# Patient Record
Sex: FEMALE | Race: BLACK OR AFRICAN AMERICAN | NOT HISPANIC OR LATINO | URBAN - METROPOLITAN AREA
[De-identification: names, ages, dates, MRNs, and addresses within clinical notes are randomized per-mention and may not be internally consistent; named-entity substitution may affect disease eponyms.]

---

## 2018-09-11 ENCOUNTER — EMERGENCY (EMERGENCY)
Facility: HOSPITAL | Age: 53
LOS: 0 days | Discharge: HOME | End: 2018-09-11
Attending: EMERGENCY MEDICINE
Payer: MEDICAID

## 2018-09-11 VITALS
DIASTOLIC BLOOD PRESSURE: 77 MMHG | TEMPERATURE: 98 F | RESPIRATION RATE: 16 BRPM | HEART RATE: 100 BPM | OXYGEN SATURATION: 98 % | SYSTOLIC BLOOD PRESSURE: 122 MMHG

## 2018-09-11 VITALS
TEMPERATURE: 98 F | HEIGHT: 65 IN | WEIGHT: 160.06 LBS | HEART RATE: 89 BPM | OXYGEN SATURATION: 96 % | RESPIRATION RATE: 16 BRPM | SYSTOLIC BLOOD PRESSURE: 122 MMHG | DIASTOLIC BLOOD PRESSURE: 71 MMHG

## 2018-09-11 DIAGNOSIS — J45.901 UNSPECIFIED ASTHMA WITH (ACUTE) EXACERBATION: ICD-10-CM

## 2018-09-11 PROCEDURE — 71046 X-RAY EXAM CHEST 2 VIEWS: CPT | Mod: 26

## 2018-09-11 PROCEDURE — 99284 EMERGENCY DEPT VISIT MOD MDM: CPT

## 2018-09-11 RX ORDER — AZITHROMYCIN 500 MG/1
500 TABLET, FILM COATED ORAL ONCE
Qty: 0 | Refills: 0 | Status: COMPLETED | OUTPATIENT
Start: 2018-09-11 | End: 2018-09-11

## 2018-09-11 RX ORDER — ALBUTEROL 90 UG/1
2 AEROSOL, METERED ORAL
Qty: 1 | Refills: 0
Start: 2018-09-11 | End: 2018-10-10

## 2018-09-11 RX ORDER — AZITHROMYCIN 500 MG/1
1 TABLET, FILM COATED ORAL
Qty: 4 | Refills: 0 | OUTPATIENT
Start: 2018-09-11 | End: 2018-09-14

## 2018-09-11 RX ORDER — ALBUTEROL 90 UG/1
3 AEROSOL, METERED ORAL
Qty: 30 | Refills: 0
Start: 2018-09-11

## 2018-09-11 RX ORDER — IPRATROPIUM/ALBUTEROL SULFATE 18-103MCG
3 AEROSOL WITH ADAPTER (GRAM) INHALATION
Qty: 0 | Refills: 0 | Status: COMPLETED | OUTPATIENT
Start: 2018-09-11 | End: 2018-09-11

## 2018-09-11 RX ADMIN — Medication 3 MILLILITER(S): at 11:55

## 2018-09-11 RX ADMIN — Medication 50 MILLIGRAM(S): at 12:23

## 2018-09-11 RX ADMIN — Medication 3 MILLILITER(S): at 12:24

## 2018-09-11 RX ADMIN — AZITHROMYCIN 500 MILLIGRAM(S): 500 TABLET, FILM COATED ORAL at 14:26

## 2018-09-11 RX ADMIN — Medication 3 MILLILITER(S): at 12:17

## 2018-09-11 NOTE — ED PROVIDER NOTE - PHYSICAL EXAMINATION
General:     NAD, well-nourished, well-appearing  Head:     NC/AT, EOMI, oral mucosa moist  Neck:     trachea midline  Lungs:   insp/exp wheezing  CVS:     S1S2, RRR, no m/g/r  Abd:     +BS, s/nt/nd, no organomegaly  Ext:    2+ radial and pedal pulses, no c/c/e  Neuro: AAOx3, no sensory/motor deficits

## 2018-09-11 NOTE — ED PROVIDER NOTE - NS ED ROS FT
Constitutional: (-) fever  (-)chills  (-)sweats  Eyes/ENT: (-) blurry vision, (-) epistaxis  (-)rhinorrhea   (-) sore throat    Cardiovascular: (-) chest pain, (-) palpitations (-) edema   Respiratory: (+) cough, (+) shortness of breath   Neurological: (-) headache, (-) altered mental status  (-)LOC

## 2018-09-11 NOTE — ED ADULT NURSE NOTE - OBJECTIVE STATEMENT
pt sitting on stretcher alert and oriented x4 c/o asthma excerbation since yesterday denies any fever

## 2018-09-11 NOTE — ED PROVIDER NOTE - OBJECTIVE STATEMENT
53yoF; with pmh signif for asthma; now p/w cough, wheezing, sob x2 days. states she does not having any albuterol inhaler so no treatment at home. denies f/c/s. denies sputum production. denies cp/palp. denies abd pain. denies n/v. denies sick contacts. denies travel.  PMH: Asthma  SOCIAL: No tobacco/illicit substance use/EtOH

## 2019-04-14 ENCOUNTER — EMERGENCY (EMERGENCY)
Facility: HOSPITAL | Age: 54
LOS: 0 days | Discharge: HOME | End: 2019-04-14
Attending: EMERGENCY MEDICINE
Payer: MEDICAID

## 2019-04-14 VITALS
WEIGHT: 164.91 LBS | OXYGEN SATURATION: 98 % | SYSTOLIC BLOOD PRESSURE: 132 MMHG | HEART RATE: 87 BPM | DIASTOLIC BLOOD PRESSURE: 83 MMHG | RESPIRATION RATE: 18 BRPM | TEMPERATURE: 98 F | HEIGHT: 66 IN

## 2019-04-14 DIAGNOSIS — R06.2 WHEEZING: ICD-10-CM

## 2019-04-14 DIAGNOSIS — J30.2 OTHER SEASONAL ALLERGIC RHINITIS: ICD-10-CM

## 2019-04-14 DIAGNOSIS — J45.901 UNSPECIFIED ASTHMA WITH (ACUTE) EXACERBATION: ICD-10-CM

## 2019-04-14 DIAGNOSIS — F17.200 NICOTINE DEPENDENCE, UNSPECIFIED, UNCOMPLICATED: ICD-10-CM

## 2019-04-14 PROCEDURE — 99284 EMERGENCY DEPT VISIT MOD MDM: CPT

## 2019-04-14 PROCEDURE — 71045 X-RAY EXAM CHEST 1 VIEW: CPT | Mod: 26

## 2019-04-14 RX ORDER — LORATADINE 10 MG/1
1 TABLET ORAL
Qty: 10 | Refills: 0 | OUTPATIENT
Start: 2019-04-14 | End: 2019-04-23

## 2019-04-14 RX ORDER — IPRATROPIUM/ALBUTEROL SULFATE 18-103MCG
3 AEROSOL WITH ADAPTER (GRAM) INHALATION ONCE
Qty: 0 | Refills: 0 | Status: COMPLETED | OUTPATIENT
Start: 2019-04-14 | End: 2019-04-14

## 2019-04-14 RX ORDER — ALBUTEROL 90 UG/1
2 AEROSOL, METERED ORAL
Qty: 1 | Refills: 0
Start: 2019-04-14 | End: 2019-05-13

## 2019-04-14 RX ORDER — ALBUTEROL 90 UG/1
2.5 AEROSOL, METERED ORAL ONCE
Qty: 0 | Refills: 0 | Status: COMPLETED | OUTPATIENT
Start: 2019-04-14 | End: 2019-04-14

## 2019-04-14 RX ORDER — ALBUTEROL 90 UG/1
3 AEROSOL, METERED ORAL
Qty: 1 | Refills: 0
Start: 2019-04-14 | End: 2019-05-13

## 2019-04-14 RX ADMIN — Medication 3 MILLILITER(S): at 22:30

## 2019-04-14 RX ADMIN — ALBUTEROL 2.5 MILLIGRAM(S): 90 AEROSOL, METERED ORAL at 21:35

## 2019-04-14 RX ADMIN — Medication 3 MILLILITER(S): at 22:48

## 2019-04-14 RX ADMIN — Medication 50 MILLIGRAM(S): at 22:35

## 2019-04-14 NOTE — ED PROVIDER NOTE - CLINICAL SUMMARY MEDICAL DECISION MAKING FREE TEXT BOX
Patient presents for asthma exacerbation, now improved.  CXR negative.  Feels much better.  Patient given prescription medications for their condition and advised to take them as prescribed and check with their Primary Care Provider if any questions arise. Discussed results and outcome of today's visit with the patient.  Patient advised to please follow up with another healthcare provider within the next 24 hours and return to the Emergency Department for worsening symptoms or any other concerns.  Patient advised that their doctor may call  to follow up on the specific results of the tests performed today in the emergency department.   Patient appears well on discharge.

## 2019-04-14 NOTE — ED PROVIDER NOTE - OBJECTIVE STATEMENT
Pertinent PMH/PSH/FHx/SHx and Review of Systems contained within:  Patient presents to the ED for asthma exacerbation.  Patient well appearing, reports increased shortness of breath and wheezing for the last few days, feels she may have seasonal allergies, reports itching around ears and tearing eyes.  Denies fever or cough.  Denies any chest pain, leg swelling, calf pain, hemoptysis, or use of exogenous estrogen.  Feels this is her typical exacerbation, not relieved with inhaler at home.  No prior history of intubation.     Relevant PMHx/SHx/SOCHx/FAMH:  Asthma  +Smoker, denies use of other illict drugs or h/o alchol abuse    ROS: No fever/chills, No headache/photophobia/eye pain/changes in vision, No ear pain/sore throat/dysphagia, No chest pain/palpitations, no cough/stridor, No abdominal pain, No N/V/D/melena, no dysuria/frequency/discharge, No neck/back pain, no rash, no changes in neurological status/function.

## 2019-04-14 NOTE — ED ADULT TRIAGE NOTE - CHIEF COMPLAINT QUOTE
c/o asthma exacerbation started today with wheezing and shortness of breath ran out of asthma inhaler at home no hx prior intubation per pt

## 2019-04-14 NOTE — ED PROVIDER NOTE - CARE PLAN
Principal Discharge DX:	Asthma, unspecified asthma severity, unspecified whether complicated, unspecified whether persistent  Secondary Diagnosis:	Seasonal allergies

## 2019-04-14 NOTE — ED PROVIDER NOTE - PHYSICAL EXAMINATION
Gen: Alert, NAD, well appearing  Head: NC, AT, PERRL, EOMI, normal lids/conjunctiva  ENT: normal hearing, patent oropharynx without erythema/exudate, uvula midline, TMs BL normal  Neck: +supple, no tenderness/meningismus/JVD, +Trachea midline  Pulm: Bilateral BS, normal resp effort, BL wheezes, no stridor/retractions  CV: RRR, no M/R/G, +dist pulses  Abd: soft, NT/ND, +BS, no palpable masses  Mskel: no edema/erythema/cyanosis  Skin: no rash, warm/dry  Neuro: AAOx3, no apparent sensory/motor deficits, coordination intact

## 2019-04-15 PROBLEM — J45.909 UNSPECIFIED ASTHMA, UNCOMPLICATED: Chronic | Status: ACTIVE | Noted: 2018-09-11

## 2020-08-03 NOTE — ED ADULT TRIAGE NOTE - BP NONINVASIVE DIASTOLIC (MM HG)
COVID-19 SCREENING    Do you or any of your household members have the following symptoms:  Fever >100.0*F or >38.0*C: No    New or worsening cough, shortness of breath, or sore throat: No    New onset of nausea, vomiting or diarrhea: No    New onset of loss of taste or smell, chills, repeated shaking with chills, muscle pain, or headache: No    Have you, a household member, or another person you have been in contact with tested positive for COVID-19 in the last 14 days?: Yes    Emergent COVID-19 Symptoms requiring Nurse Triage:  Trouble breathing, Persistent pain or pressure in the chest, New confusion, Inability to wake or stay awake, Bluish lips or face    If any of the above questions are positive and the patient is not having emergent symptoms - order COVID-19 lab test, register, & schedule patient for community testing.     DO NOT schedule any other appointments unless directed by a facility or provider (ex:  being discharged from hospital needing 1st pediatric appointment, TIA clinic patients, etc). Appointments can be canceled, but DO NOT schedule/reschedule patient requested appointments.        83

## 2022-02-15 NOTE — ED ADULT NURSE NOTE - NSFALLRSKPASTHIST_ED_ALL_ED
no Mirvaso Counseling: Mirvaso is a topical medication which can decrease superficial blood flow where applied. Side effects are uncommon and include stinging, redness and allergic reactions.

## 2023-05-31 ENCOUNTER — INPATIENT (INPATIENT)
Facility: HOSPITAL | Age: 58
LOS: 3 days | Discharge: HOME | End: 2023-06-04
Attending: HOSPITALIST | Admitting: HOSPITALIST
Payer: MEDICAID

## 2023-05-31 VITALS
WEIGHT: 169.98 LBS | SYSTOLIC BLOOD PRESSURE: 151 MMHG | DIASTOLIC BLOOD PRESSURE: 110 MMHG | HEIGHT: 63 IN | OXYGEN SATURATION: 100 % | TEMPERATURE: 98 F | HEART RATE: 131 BPM | RESPIRATION RATE: 19 BRPM

## 2023-05-31 LAB
ALBUMIN SERPL ELPH-MCNC: 4.2 G/DL — SIGNIFICANT CHANGE UP (ref 3.3–5)
ALP SERPL-CCNC: 109 U/L — SIGNIFICANT CHANGE UP (ref 40–120)
ALT FLD-CCNC: 42 U/L — SIGNIFICANT CHANGE UP (ref 12–78)
ANION GAP SERPL CALC-SCNC: 8 MMOL/L — SIGNIFICANT CHANGE UP (ref 5–17)
AST SERPL-CCNC: 39 U/L — HIGH (ref 15–37)
BASOPHILS # BLD AUTO: 0.01 K/UL — SIGNIFICANT CHANGE UP (ref 0–0.2)
BASOPHILS NFR BLD AUTO: 0.2 % — SIGNIFICANT CHANGE UP (ref 0–2)
BILIRUB SERPL-MCNC: 0.5 MG/DL — SIGNIFICANT CHANGE UP (ref 0.2–1.2)
BUN SERPL-MCNC: 22 MG/DL — SIGNIFICANT CHANGE UP (ref 7–23)
CALCIUM SERPL-MCNC: 9.9 MG/DL — SIGNIFICANT CHANGE UP (ref 8.5–10.1)
CHLORIDE SERPL-SCNC: 108 MMOL/L — SIGNIFICANT CHANGE UP (ref 96–108)
CO2 SERPL-SCNC: 25 MMOL/L — SIGNIFICANT CHANGE UP (ref 22–31)
CREAT SERPL-MCNC: 0.7 MG/DL — SIGNIFICANT CHANGE UP (ref 0.5–1.3)
EGFR: 101 ML/MIN/1.73M2 — SIGNIFICANT CHANGE UP
EOSINOPHIL # BLD AUTO: 0.01 K/UL — SIGNIFICANT CHANGE UP (ref 0–0.5)
EOSINOPHIL NFR BLD AUTO: 0.2 % — SIGNIFICANT CHANGE UP (ref 0–6)
ETHANOL SERPL-MCNC: 57 MG/DL — HIGH (ref 0–10)
GLUCOSE SERPL-MCNC: 97 MG/DL — SIGNIFICANT CHANGE UP (ref 70–99)
HCG SERPL-ACNC: 3 MIU/ML — SIGNIFICANT CHANGE UP
HCT VFR BLD CALC: 44.2 % — SIGNIFICANT CHANGE UP (ref 34.5–45)
HGB BLD-MCNC: 14.9 G/DL — SIGNIFICANT CHANGE UP (ref 11.5–15.5)
IMM GRANULOCYTES NFR BLD AUTO: 0.4 % — SIGNIFICANT CHANGE UP (ref 0–0.9)
LYMPHOCYTES # BLD AUTO: 1.52 K/UL — SIGNIFICANT CHANGE UP (ref 1–3.3)
LYMPHOCYTES # BLD AUTO: 33 % — SIGNIFICANT CHANGE UP (ref 13–44)
MAGNESIUM SERPL-MCNC: 2 MG/DL — SIGNIFICANT CHANGE UP (ref 1.6–2.6)
MCHC RBC-ENTMCNC: 32.3 PG — SIGNIFICANT CHANGE UP (ref 27–34)
MCHC RBC-ENTMCNC: 33.7 G/DL — SIGNIFICANT CHANGE UP (ref 32–36)
MCV RBC AUTO: 95.9 FL — SIGNIFICANT CHANGE UP (ref 80–100)
MONOCYTES # BLD AUTO: 0.31 K/UL — SIGNIFICANT CHANGE UP (ref 0–0.9)
MONOCYTES NFR BLD AUTO: 6.7 % — SIGNIFICANT CHANGE UP (ref 2–14)
NEUTROPHILS # BLD AUTO: 2.73 K/UL — SIGNIFICANT CHANGE UP (ref 1.8–7.4)
NEUTROPHILS NFR BLD AUTO: 59.5 % — SIGNIFICANT CHANGE UP (ref 43–77)
NRBC # BLD: 0 /100 WBCS — SIGNIFICANT CHANGE UP (ref 0–0)
PLATELET # BLD AUTO: 186 K/UL — SIGNIFICANT CHANGE UP (ref 150–400)
POTASSIUM SERPL-MCNC: 5.2 MMOL/L — SIGNIFICANT CHANGE UP (ref 3.5–5.3)
POTASSIUM SERPL-SCNC: 5.2 MMOL/L — SIGNIFICANT CHANGE UP (ref 3.5–5.3)
PROT SERPL-MCNC: 8.6 GM/DL — HIGH (ref 6–8.3)
RBC # BLD: 4.61 M/UL — SIGNIFICANT CHANGE UP (ref 3.8–5.2)
RBC # FLD: 14.5 % — SIGNIFICANT CHANGE UP (ref 10.3–14.5)
SODIUM SERPL-SCNC: 141 MMOL/L — SIGNIFICANT CHANGE UP (ref 135–145)
WBC # BLD: 4.6 K/UL — SIGNIFICANT CHANGE UP (ref 3.8–10.5)
WBC # FLD AUTO: 4.6 K/UL — SIGNIFICANT CHANGE UP (ref 3.8–10.5)

## 2023-05-31 PROCEDURE — 99222 1ST HOSP IP/OBS MODERATE 55: CPT

## 2023-05-31 PROCEDURE — 99285 EMERGENCY DEPT VISIT HI MDM: CPT

## 2023-05-31 PROCEDURE — 93010 ELECTROCARDIOGRAM REPORT: CPT

## 2023-05-31 RX ORDER — FOLIC ACID 0.8 MG
1 TABLET ORAL ONCE
Refills: 0 | Status: COMPLETED | OUTPATIENT
Start: 2023-05-31 | End: 2023-05-31

## 2023-05-31 RX ORDER — PANTOPRAZOLE SODIUM 20 MG/1
40 TABLET, DELAYED RELEASE ORAL ONCE
Refills: 0 | Status: COMPLETED | OUTPATIENT
Start: 2023-05-31 | End: 2023-05-31

## 2023-05-31 RX ORDER — SODIUM CHLORIDE 9 MG/ML
1000 INJECTION INTRAMUSCULAR; INTRAVENOUS; SUBCUTANEOUS ONCE
Refills: 0 | Status: COMPLETED | OUTPATIENT
Start: 2023-05-31 | End: 2023-05-31

## 2023-05-31 RX ORDER — THIAMINE MONONITRATE (VIT B1) 100 MG
100 TABLET ORAL ONCE
Refills: 0 | Status: COMPLETED | OUTPATIENT
Start: 2023-05-31 | End: 2023-05-31

## 2023-05-31 RX ORDER — ALBUTEROL 90 UG/1
2 AEROSOL, METERED ORAL EVERY 6 HOURS
Refills: 0 | Status: DISCONTINUED | OUTPATIENT
Start: 2023-05-31 | End: 2023-06-04

## 2023-05-31 RX ORDER — DILTIAZEM HCL 120 MG
60 CAPSULE, EXT RELEASE 24 HR ORAL THREE TIMES A DAY
Refills: 0 | Status: DISCONTINUED | OUTPATIENT
Start: 2023-05-31 | End: 2023-06-04

## 2023-05-31 RX ADMIN — Medication 4 MILLIGRAM(S): at 20:17

## 2023-05-31 RX ADMIN — Medication 1 MILLIGRAM(S): at 17:10

## 2023-05-31 RX ADMIN — SODIUM CHLORIDE 1000 MILLILITER(S): 9 INJECTION INTRAMUSCULAR; INTRAVENOUS; SUBCUTANEOUS at 12:07

## 2023-05-31 RX ADMIN — Medication 50 MILLIGRAM(S): at 12:08

## 2023-05-31 RX ADMIN — Medication 2 MILLIGRAM(S): at 16:38

## 2023-05-31 RX ADMIN — Medication 1 MILLIGRAM(S): at 18:06

## 2023-05-31 RX ADMIN — PANTOPRAZOLE SODIUM 40 MILLIGRAM(S): 20 TABLET, DELAYED RELEASE ORAL at 12:08

## 2023-05-31 RX ADMIN — Medication 100 MILLIGRAM(S): at 17:09

## 2023-05-31 NOTE — ED ADULT NURSE NOTE - NSFALLRISKINTERV_ED_ALL_ED
Assistance OOB with selected safe patient handling equipment if applicable/Assistance with ambulation/Communicate fall risk and risk factors to all staff, patient, and family/Monitor gait and stability/Monitor for mental status changes and reorient to person, place, and time, as needed/Provide visual cue: yellow wristband, yellow gown, etc/Reinforce activity limits and safety measures with patient and family/Toileting schedule using arm’s reach rule for commode and bathroom/Use of alarms - bed, stretcher, chair and/or video monitoring/Call bell, personal items and telephone in reach/Instruct patient to call for assistance before getting out of bed/chair/stretcher/Non-slip footwear applied when patient is off stretcher/[place] to call system/Physically safe environment - no spills, clutter or unnecessary equipment/Purposeful Proactive Rounding/Room/bathroom lighting operational, light cord in reach

## 2023-05-31 NOTE — ED PROVIDER NOTE - CROS ED PSYCH ALL NEG
Telephone Encounter by Leonarda Medley RMA at 06/06/17 01:26 PM     Author:  Leonarda Medley RMA Service:  (none) Author Type:  Certified Medical Assistant     Filed:  06/06/17 01:28 PM Encounter Date:  6/3/2017 Status:  Signed     :  Leonarda Medley RMA (Certified Medical Assistant)            Refilled per medication protocol.[BV1.1T]  Rx sent to pharmacy requested.[BV1.1M]      Revision History        User Key Date/Time User Provider Type Action    > BV1.1 06/06/17 01:28 PM Leonarda Medley RMA Certified Medical Assistant Sign    M - Manual, T - Template            
- - -

## 2023-05-31 NOTE — ED ADULT NURSE NOTE - OBJECTIVE STATEMENT
received er bed h38 biba c/o tremors b/l hands/arms and alcohol wdl drinks 1 bottle vodka 3-4 times a week last drink yesterday denies other drug use denies previous wld episodes a&ox3 denies any pain at present

## 2023-05-31 NOTE — H&P ADULT - NSHPPHYSICALEXAM_GEN_ALL_CORE
PHYSICAL EXAMINATION:  Vital Signs Last 24 Hrs  T(C): 36.6 (31 May 2023 16:03), Max: 36.6 (31 May 2023 10:21)  T(F): 97.9 (31 May 2023 16:03), Max: 97.9 (31 May 2023 16:03)  HR: 105 (31 May 2023 16:03) (105 - 131)  BP: 158/117 (31 May 2023 16:03) (151/110 - 158/117)  BP(mean): --  RR: 18 (31 May 2023 16:03) (18 - 19)  SpO2: 98% (31 May 2023 16:03) (98% - 100%)    Parameters below as of 31 May 2023 16:03  Patient On (Oxygen Delivery Method): room air      CAPILLARY BLOOD GLUCOSE      POCT Blood Glucose.: 141 mg/dL (31 May 2023 10:24)      GENERAL: NAD,   ENMT: mucous membranes moist  NECK: supple, No JVD  CHEST/LUNG: clear to auscultation bilaterally; no rales, rhonchi, or wheezing b/l  HEART: normal S1, S2  ABDOMEN: BS+, soft, ND, NT   EXTREMITIES:  pulses palpable; no clubbing, cyanosis, or edema b/l LEs  NEURO: awake, alert, interactive; moves all extremities PHYSICAL EXAMINATION:  Vital Signs Last 24 Hrs  T(C): 36.6 (31 May 2023 16:03), Max: 36.6 (31 May 2023 10:21)  T(F): 97.9 (31 May 2023 16:03), Max: 97.9 (31 May 2023 16:03)  HR: 105 (31 May 2023 16:03) (105 - 131)  BP: 158/117 (31 May 2023 16:03) (151/110 - 158/117)  BP(mean): --  RR: 18 (31 May 2023 16:03) (18 - 19)  SpO2: 98% (31 May 2023 16:03) (98% - 100%)    Parameters below as of 31 May 2023 16:03  Patient On (Oxygen Delivery Method): room air      CAPILLARY BLOOD GLUCOSE      POCT Blood Glucose.: 141 mg/dL (31 May 2023 10:24)      GENERAL: NAD, seen in ER, comfortable, mild anxiety, no fevers.   ENMT: mucous membranes moist  NECK: supple, No JVD  CHEST/LUNG: clear to auscultation bilaterally; no rales, rhonchi, or wheezing b/l  HEART: normal S1, S2  ABDOMEN: BS+, soft, ND, NT   EXTREMITIES:  pulses palpable; no clubbing, cyanosis, or edema b/l LEs  NEURO: awake, alert, interactive; moves all extremities

## 2023-05-31 NOTE — SBIRT NOTE ADULT - NSSBIRTALCPASSREFTXDET_GEN_A_CORE
Provided SBIRT services: Full screen positive. Referral to Treatment attempted. Screening results were reviewed with the patient and patient was provided information about healthy guidelines and potential negative consequences associated with level of risk. Motivation and readiness to reduce or stop use was discussed and goals and activities to make changes were suggested/offered.  Options discussed for further evaluation and treatment, but referral to treatment was not completed because: Patient declined referrals/resources/support. Stated she stops for months at a time and does not help. Patient reports she will cut out alcohol on her own.

## 2023-05-31 NOTE — H&P ADULT - NSHPLABSRESULTS_GEN_ALL_CORE
LABS:                        14.9   4.60  )-----------( 186      ( 31 May 2023 12:00 )             44.2     05-31    141  |  108  |  22  ----------------------------<  97  5.2   |  25  |  0.70    Ca    9.9      31 May 2023 12:00  Mg     2.0     05-31    TPro  8.6<H>  /  Alb  4.2  /  TBili  0.5  /  DBili  x   /  AST  39<H>  /  ALT  42  /  AlkPhos  109  05-31            RADIOLOGY & ADDITIONAL TESTS:

## 2023-05-31 NOTE — H&P ADULT - HISTORY OF PRESENT ILLNESS
58 years old female here due to drinking alcohol daily since 12 of 2022. Patient's girl friend called EMS. Pt here states she is depressed but denies harmful thoughts to herself or others. Pt sts her last drink was last night. Pt denies recent hx of trauma, headache, sob, chest pain, nausea, vomiting, fever, chills, abd pain, dysuria, hematuria.  ETOH level was 57 on arrival.  58 years old female here due to drinking alcohol daily since 12 of 2022. Patient's girl friend called EMS. Pt here states she is depressed but denies harmful thoughts to herself or others. Pt sts her last drink was last night. Pt denies recent hx of trauma, headache, sob, chest pain, nausea, vomiting, fever, chills, abd pain, dysuria, hematuria.  ETOH level was 57 on arrival.   CIWA score was 7 in ER. Speaks mostly [place].

## 2023-05-31 NOTE — ED PROVIDER NOTE - CLINICAL SUMMARY MEDICAL DECISION MAKING FREE TEXT BOX
pt has been drinking alcohol daily last drink was last night. pt denies harmful thoughts to herself or others pt is tachy CIWA is 7. Pt also denies auditory/visual hallucination pt is not in DT.

## 2023-05-31 NOTE — ED PROVIDER NOTE - OBJECTIVE STATEMENT
58 years old female here being drinking alcohol daily since 12 of 2022 pt's girl friend call the ems. Pt here sts she is depressed but denies harmful thoughts to herself or others. Pt sts her last night was last night. Pt denies recent hx of trauma, headache, dizziness, blurred visions, light sensitivities, neck/back/hips/calfs pain, cough, sob, chest pain, nausea, vomiting, fever, chills, abd pain, dysuria, hematuria, vaginal spotting or discharge or irregular bowel movements.

## 2023-05-31 NOTE — ED PROVIDER NOTE - MUSCULOSKELETAL, MLM
Spine appears normal, range of motion is not limited, no muscle or joint tenderness no edema nontender to palp cervical spines and legs

## 2023-05-31 NOTE — ED PROVIDER NOTE - ENMT, MLM
Airway patent, Nasal mucosa clear. Mouth with normal mucosa. Throat has no vesicles, no oropharyngeal exudates and uvula is midline. no hematoma no wound to the scalp or face.

## 2023-06-01 LAB
ANION GAP SERPL CALC-SCNC: 7 MMOL/L — SIGNIFICANT CHANGE UP (ref 5–17)
BUN SERPL-MCNC: 17 MG/DL — SIGNIFICANT CHANGE UP (ref 7–23)
CALCIUM SERPL-MCNC: 8.9 MG/DL — SIGNIFICANT CHANGE UP (ref 8.5–10.1)
CHLORIDE SERPL-SCNC: 109 MMOL/L — HIGH (ref 96–108)
CO2 SERPL-SCNC: 25 MMOL/L — SIGNIFICANT CHANGE UP (ref 22–31)
CREAT SERPL-MCNC: 0.55 MG/DL — SIGNIFICANT CHANGE UP (ref 0.5–1.3)
EGFR: 108 ML/MIN/1.73M2 — SIGNIFICANT CHANGE UP
GLUCOSE SERPL-MCNC: 78 MG/DL — SIGNIFICANT CHANGE UP (ref 70–99)
MAGNESIUM SERPL-MCNC: 1.9 MG/DL — SIGNIFICANT CHANGE UP (ref 1.6–2.6)
PHOSPHATE SERPL-MCNC: 3.1 MG/DL — SIGNIFICANT CHANGE UP (ref 2.5–4.5)
POTASSIUM SERPL-MCNC: 3.4 MMOL/L — LOW (ref 3.5–5.3)
POTASSIUM SERPL-SCNC: 3.4 MMOL/L — LOW (ref 3.5–5.3)
SODIUM SERPL-SCNC: 141 MMOL/L — SIGNIFICANT CHANGE UP (ref 135–145)

## 2023-06-01 PROCEDURE — 99232 SBSQ HOSP IP/OBS MODERATE 35: CPT

## 2023-06-01 RX ORDER — POTASSIUM CHLORIDE 20 MEQ
40 PACKET (EA) ORAL ONCE
Refills: 0 | Status: COMPLETED | OUTPATIENT
Start: 2023-06-01 | End: 2023-06-01

## 2023-06-01 RX ADMIN — Medication 4 MILLIGRAM(S): at 09:54

## 2023-06-01 RX ADMIN — Medication 60 MILLIGRAM(S): at 14:34

## 2023-06-01 RX ADMIN — Medication 60 MILLIGRAM(S): at 21:31

## 2023-06-01 RX ADMIN — Medication 4 MILLIGRAM(S): at 14:34

## 2023-06-01 RX ADMIN — Medication 4 MILLIGRAM(S): at 06:08

## 2023-06-01 RX ADMIN — Medication 3 MILLIGRAM(S): at 17:36

## 2023-06-01 RX ADMIN — Medication 3 MILLIGRAM(S): at 21:32

## 2023-06-01 RX ADMIN — Medication 60 MILLIGRAM(S): at 06:07

## 2023-06-01 RX ADMIN — Medication 40 MILLIEQUIVALENT(S): at 09:54

## 2023-06-02 LAB
ALBUMIN SERPL ELPH-MCNC: 3.6 G/DL — SIGNIFICANT CHANGE UP (ref 3.3–5)
ALP SERPL-CCNC: 94 U/L — SIGNIFICANT CHANGE UP (ref 40–120)
ALT FLD-CCNC: 29 U/L — SIGNIFICANT CHANGE UP (ref 12–78)
ANION GAP SERPL CALC-SCNC: 5 MMOL/L — SIGNIFICANT CHANGE UP (ref 5–17)
AST SERPL-CCNC: 25 U/L — SIGNIFICANT CHANGE UP (ref 15–37)
BILIRUB SERPL-MCNC: 0.8 MG/DL — SIGNIFICANT CHANGE UP (ref 0.2–1.2)
BUN SERPL-MCNC: 20 MG/DL — SIGNIFICANT CHANGE UP (ref 7–23)
CALCIUM SERPL-MCNC: 9.5 MG/DL — SIGNIFICANT CHANGE UP (ref 8.5–10.1)
CHLORIDE SERPL-SCNC: 111 MMOL/L — HIGH (ref 96–108)
CO2 SERPL-SCNC: 22 MMOL/L — SIGNIFICANT CHANGE UP (ref 22–31)
CREAT SERPL-MCNC: 0.78 MG/DL — SIGNIFICANT CHANGE UP (ref 0.5–1.3)
EGFR: 89 ML/MIN/1.73M2 — SIGNIFICANT CHANGE UP
GLUCOSE SERPL-MCNC: 100 MG/DL — HIGH (ref 70–99)
MAGNESIUM SERPL-MCNC: 2 MG/DL — SIGNIFICANT CHANGE UP (ref 1.6–2.6)
PHOSPHATE SERPL-MCNC: 2.7 MG/DL — SIGNIFICANT CHANGE UP (ref 2.5–4.5)
POTASSIUM SERPL-MCNC: 4 MMOL/L — SIGNIFICANT CHANGE UP (ref 3.5–5.3)
POTASSIUM SERPL-SCNC: 4 MMOL/L — SIGNIFICANT CHANGE UP (ref 3.5–5.3)
PROT SERPL-MCNC: 6.8 GM/DL — SIGNIFICANT CHANGE UP (ref 6–8.3)
SODIUM SERPL-SCNC: 138 MMOL/L — SIGNIFICANT CHANGE UP (ref 135–145)

## 2023-06-02 PROCEDURE — 99232 SBSQ HOSP IP/OBS MODERATE 35: CPT

## 2023-06-02 RX ORDER — THIAMINE MONONITRATE (VIT B1) 100 MG
100 TABLET ORAL DAILY
Refills: 0 | Status: DISCONTINUED | OUTPATIENT
Start: 2023-06-02 | End: 2023-06-04

## 2023-06-02 RX ORDER — FOLIC ACID 0.8 MG
1 TABLET ORAL DAILY
Refills: 0 | Status: DISCONTINUED | OUTPATIENT
Start: 2023-06-02 | End: 2023-06-04

## 2023-06-02 RX ADMIN — Medication 1 TABLET(S): at 18:56

## 2023-06-02 RX ADMIN — Medication 3 MILLIGRAM(S): at 10:36

## 2023-06-02 RX ADMIN — Medication 1 MILLIGRAM(S): at 18:56

## 2023-06-02 RX ADMIN — Medication 1 MILLIGRAM(S): at 04:51

## 2023-06-02 RX ADMIN — Medication 60 MILLIGRAM(S): at 05:20

## 2023-06-02 RX ADMIN — Medication 3 MILLIGRAM(S): at 06:23

## 2023-06-02 RX ADMIN — Medication 2 MILLIGRAM(S): at 23:06

## 2023-06-02 RX ADMIN — Medication 3 MILLIGRAM(S): at 14:43

## 2023-06-02 RX ADMIN — Medication 2 MILLIGRAM(S): at 18:56

## 2023-06-02 RX ADMIN — Medication 3 MILLIGRAM(S): at 02:16

## 2023-06-02 NOTE — PHARMACOTHERAPY INTERVENTION NOTE - INTERVENTION TYPE RECOOMEND
Therapy Recommended - Drug indicated but not ordered

## 2023-06-02 NOTE — PHARMACOTHERAPY INTERVENTION NOTE - COMMENTS
Recommended to initiate thiamine 100 mG by mouth once a day for 5 days for thiamine supplementation as patient is presenting for alcohol withdrawal.
Recommended to initiate ICD's for DVT prophylaxis as patient is at risk of VTE and does not qualify for chemoprophylaxis based on IMPROVEDD score.
Recommended to initiate multivitamin 1 tablet by mouth once a day for supplementation as patient presents for alcohol withdrawal.
Recommended to initiate folic acid 1 mG by mouth once a day for folate supplementation as patient presents for alcohol withdrawal.

## 2023-06-03 LAB
ANION GAP SERPL CALC-SCNC: 5 MMOL/L — SIGNIFICANT CHANGE UP (ref 5–17)
BUN SERPL-MCNC: 22 MG/DL — SIGNIFICANT CHANGE UP (ref 7–23)
CALCIUM SERPL-MCNC: 9.4 MG/DL — SIGNIFICANT CHANGE UP (ref 8.5–10.1)
CHLORIDE SERPL-SCNC: 107 MMOL/L — SIGNIFICANT CHANGE UP (ref 96–108)
CO2 SERPL-SCNC: 27 MMOL/L — SIGNIFICANT CHANGE UP (ref 22–31)
CREAT SERPL-MCNC: 0.66 MG/DL — SIGNIFICANT CHANGE UP (ref 0.5–1.3)
EGFR: 103 ML/MIN/1.73M2 — SIGNIFICANT CHANGE UP
GLUCOSE SERPL-MCNC: 112 MG/DL — HIGH (ref 70–99)
POTASSIUM SERPL-MCNC: 4.2 MMOL/L — SIGNIFICANT CHANGE UP (ref 3.5–5.3)
POTASSIUM SERPL-SCNC: 4.2 MMOL/L — SIGNIFICANT CHANGE UP (ref 3.5–5.3)
SODIUM SERPL-SCNC: 139 MMOL/L — SIGNIFICANT CHANGE UP (ref 135–145)

## 2023-06-03 PROCEDURE — 99232 SBSQ HOSP IP/OBS MODERATE 35: CPT

## 2023-06-03 RX ADMIN — Medication 2 MILLIGRAM(S): at 05:14

## 2023-06-03 RX ADMIN — Medication 25 MILLIGRAM(S): at 21:15

## 2023-06-03 RX ADMIN — Medication 60 MILLIGRAM(S): at 21:15

## 2023-06-03 RX ADMIN — Medication 1 TABLET(S): at 11:49

## 2023-06-03 RX ADMIN — Medication 2 MILLIGRAM(S): at 10:10

## 2023-06-03 RX ADMIN — Medication 25 MILLIGRAM(S): at 13:40

## 2023-06-03 RX ADMIN — Medication 60 MILLIGRAM(S): at 14:03

## 2023-06-03 RX ADMIN — Medication 100 MILLIGRAM(S): at 11:49

## 2023-06-03 RX ADMIN — Medication 1 MILLIGRAM(S): at 11:49

## 2023-06-03 NOTE — PROGRESS NOTE ADULT - ASSESSMENT
58 years old female here due to drinking alcohol daily since 12 of 2022. Patient's girl friend called EMS. Pt here states she is depressed but denies harmful thoughts to herself or others. Pt sts her last drink was last night. Pt denies recent hx of trauma, headache, sob, chest pain, nausea, vomiting, fever, chills, abd pain, dysuria, hematuria.  ETOH level was 57 on arrival.       ETOH withdrawal.   - CIWA protocol with IV Ativan PRN. Follow CIWA score.   - VSS   - follow lytes 
58 years old female here due to drinking alcohol daily since 12 of 2022. Patient's girl friend called EMS. Pt here states she is depressed but denies harmful thoughts to herself or others. Pt sts her last drink was last night. Pt denies recent hx of trauma, headache, sob, chest pain, nausea, vomiting, fever, chills, abd pain, dysuria, hematuria.  ETOH level was 57 on arrival.       ETOH withdrawal.   - CIWA protocol with IV Ativan PRN.  CIWA 3  - VSS   - stable lytes 
58 years old female here due to drinking alcohol daily since 12 of 2022. Patient's girl friend called EMS. Pt here states she is depressed but denies harmful thoughts to herself or others. Pt sts her last drink was last night. Pt denies recent hx of trauma, headache, sob, chest pain, nausea, vomiting, fever, chills, abd pain, dysuria, hematuria.  ETOH level was 57 on arrival.       ETOH withdrawal.   - CIWA protocol with IV Ativan PRN.  CIWA now 1-3  - VSS   - stable lytes   -dc planning   -etoh cessation disussed

## 2023-06-03 NOTE — PROGRESS NOTE ADULT - SUBJECTIVE AND OBJECTIVE BOX
Patient is a 58y old  Female who presents with a chief complaint of ETOH withdrawal. (31 May 2023 16:47)      INTERVAL HPI/OVERNIGHT EVENTS: none    MEDICATIONS  (STANDING):  diltiazem    Tablet 60 milliGRAM(s) Oral three times a day  LORazepam   Injectable 2 milliGRAM(s) IV Push every 4 hours  LORazepam   Injectable   IV Push   LORazepam   Injectable 3 milliGRAM(s) IV Push every 4 hours    MEDICATIONS  (PRN):  albuterol    90 MICROgram(s) HFA Inhaler 2 Puff(s) Inhalation every 6 hours PRN Shortness of Breath and/or Wheezing  LORazepam   Injectable 1 milliGRAM(s) IV Push every 4 hours PRN Agitation        Allergies    No Known Allergies    Intolerances              Vital Signs Last 24 Hrs  T(C): 36.3 (03 Jun 2023 05:06), Max: 37.1 (02 Jun 2023 18:26)  T(F): 97.4 (03 Jun 2023 05:06), Max: 98.8 (02 Jun 2023 18:26)  HR: 79 (03 Jun 2023 05:06) (79 - 99)  BP: 106/68 (03 Jun 2023 05:06) (99/68 - 112/76)  BP(mean): --  RR: 18 (03 Jun 2023 05:06) (16 - 18)  SpO2: 96% (03 Jun 2023 05:06) (96% - 100%)    Parameters below as of 03 Jun 2023 05:06  Patient On (Oxygen Delivery Method): room air              PHYSICAL EXAM:  GENERAL: NAD  HEAD:  Atraumatic, Normocephalic  EYES: EOMI, PERRLA, conjunctiva and sclera clear  ENMT: No tonsillar erythema, exudates, or enlargement;   NECK: Supple, Normal thyroid  NERVOUS SYSTEM:  sedated, confused, from x4   CHEST/LUNG: CTABL; No rales, rhonchi, wheezing, or rubs  HEART: Regular rate and rhythm; No murmurs, rubs, or gallops  ABDOMEN: Soft, Nontender, Nondistended; Bowel sounds present  EXTREMITIES:  2+ Peripheral Pulses, No clubbing, cyanosis, or edema  LYMPH: No lymphadenopathy noted  SKIN: No rashes or lesions    LABS:               06-02    138  |  111<H>  |  20  ----------------------------<  100<H>  4.0   |  22  |  0.78    Ca    9.5      02 Jun 2023 06:33  Phos  2.7     06-02  Mg     2.0     06-02    TPro  6.8  /  Alb  3.6  /  TBili  0.8  /  DBili  x   /  AST  25  /  ALT  29  /  AlkPhos  94  06-02                      CAPILLARY BLOOD GLUCOSE          RADIOLOGY & ADDITIONAL TESTS:    Imaging Personally Reviewed:  [ ] YES  [ ] NO    Consultant(s) Notes Reviewed:  [ ] YES  [ ] NO    Care Discussed with Consultants/Other Providers [ ] YES  [ ] NO
Patient is a 58y old  Female who presents with a chief complaint of ETOH withdrawal. (31 May 2023 16:47)      INTERVAL HPI/OVERNIGHT EVENTS: none    MEDICATIONS  (STANDING):  diltiazem    Tablet 60 milliGRAM(s) Oral three times a day  LORazepam   Injectable 2 milliGRAM(s) IV Push every 4 hours  LORazepam   Injectable   IV Push   LORazepam   Injectable 3 milliGRAM(s) IV Push every 4 hours    MEDICATIONS  (PRN):  albuterol    90 MICROgram(s) HFA Inhaler 2 Puff(s) Inhalation every 6 hours PRN Shortness of Breath and/or Wheezing  LORazepam   Injectable 1 milliGRAM(s) IV Push every 4 hours PRN Agitation        Allergies    No Known Allergies    Intolerances              Vital Signs Last 24 Hrs  T(C): 36.6 (02 Jun 2023 04:45), Max: 36.9 (01 Jun 2023 23:55)  T(F): 97.9 (02 Jun 2023 04:45), Max: 98.4 (01 Jun 2023 23:55)  HR: 84 (02 Jun 2023 04:45) (76 - 103)  BP: 119/86 (02 Jun 2023 04:45) (110/76 - 137/95)  BP(mean): --  RR: 18 (02 Jun 2023 04:45) (16 - 18)  SpO2: 97% (02 Jun 2023 04:45) (97% - 99%)    Parameters below as of 02 Jun 2023 04:45  Patient On (Oxygen Delivery Method): room air            PHYSICAL EXAM:  GENERAL: NAD  HEAD:  Atraumatic, Normocephalic  EYES: EOMI, PERRLA, conjunctiva and sclera clear  ENMT: No tonsillar erythema, exudates, or enlargement;   NECK: Supple, Normal thyroid  NERVOUS SYSTEM:  sedated, confused, from x4   CHEST/LUNG: CTABL; No rales, rhonchi, wheezing, or rubs  HEART: Regular rate and rhythm; No murmurs, rubs, or gallops  ABDOMEN: Soft, Nontender, Nondistended; Bowel sounds present  EXTREMITIES:  2+ Peripheral Pulses, No clubbing, cyanosis, or edema  LYMPH: No lymphadenopathy noted  SKIN: No rashes or lesions    LABS:                                   14.9   4.60  )-----------( 186      ( 31 May 2023 12:00 )             44.2     06-02    138  |  111<H>  |  20  ----------------------------<  100<H>  4.0   |  22  |  0.78    Ca    9.5      02 Jun 2023 06:33  Phos  2.7     06-02  Mg     2.0     06-02    TPro  6.8  /  Alb  3.6  /  TBili  0.8  /  DBili  x   /  AST  25  /  ALT  29  /  AlkPhos  94  06-02              CAPILLARY BLOOD GLUCOSE          RADIOLOGY & ADDITIONAL TESTS:    Imaging Personally Reviewed:  [ ] YES  [ ] NO    Consultant(s) Notes Reviewed:  [ ] YES  [ ] NO    Care Discussed with Consultants/Other Providers [ ] YES  [ ] NO
Patient is a 58y old  Female who presents with a chief complaint of ETOH withdrawal. (31 May 2023 16:47)      INTERVAL HPI/OVERNIGHT EVENTS: none    MEDICATIONS  (STANDING):  diltiazem    Tablet 60 milliGRAM(s) Oral three times a day  LORazepam   Injectable 3 milliGRAM(s) IV Push every 4 hours  LORazepam   Injectable   IV Push     MEDICATIONS  (PRN):  albuterol    90 MICROgram(s) HFA Inhaler 2 Puff(s) Inhalation every 6 hours PRN Shortness of Breath and/or Wheezing  LORazepam   Injectable 1 milliGRAM(s) IV Push every 4 hours PRN Agitation      Allergies    No Known Allergies    Intolerances              Vital Signs Last 24 Hrs  T(C): 36.7 (01 Jun 2023 10:54), Max: 36.7 (01 Jun 2023 10:54)  T(F): 98.1 (01 Jun 2023 10:54), Max: 98.1 (01 Jun 2023 10:54)  HR: 94 (01 Jun 2023 10:54) (88 - 116)  BP: 137/95 (01 Jun 2023 10:54) (137/95 - 158/117)  BP(mean): --  RR: 17 (01 Jun 2023 10:54) (16 - 29)  SpO2: 99% (01 Jun 2023 10:54) (98% - 100%)    Parameters below as of 31 May 2023 23:05  Patient On (Oxygen Delivery Method): room air        PHYSICAL EXAM:  GENERAL: NAD  HEAD:  Atraumatic, Normocephalic  EYES: EOMI, PERRLA, conjunctiva and sclera clear  ENMT: No tonsillar erythema, exudates, or enlargement;   NECK: Supple, Normal thyroid  NERVOUS SYSTEM:  sedated, confused, from x4   CHEST/LUNG: CTABL; No rales, rhonchi, wheezing, or rubs  HEART: Regular rate and rhythm; No murmurs, rubs, or gallops  ABDOMEN: Soft, Nontender, Nondistended; Bowel sounds present  EXTREMITIES:  2+ Peripheral Pulses, No clubbing, cyanosis, or edema  LYMPH: No lymphadenopathy noted  SKIN: No rashes or lesions    LABS:                        14.9   4.60  )-----------( 186      ( 31 May 2023 12:00 )             44.2     06-01    141  |  109<H>  |  17  ----------------------------<  78  3.4<L>   |  25  |  0.55    Ca    8.9      01 Jun 2023 07:25  Phos  3.1     06-01  Mg     1.9     06-01    TPro  8.6<H>  /  Alb  4.2  /  TBili  0.5  /  DBili  x   /  AST  39<H>  /  ALT  42  /  AlkPhos  109  05-31        CAPILLARY BLOOD GLUCOSE          RADIOLOGY & ADDITIONAL TESTS:    Imaging Personally Reviewed:  [ ] YES  [ ] NO    Consultant(s) Notes Reviewed:  [ ] YES  [ ] NO    Care Discussed with Consultants/Other Providers [ ] YES  [ ] NO

## 2023-06-04 VITALS
SYSTOLIC BLOOD PRESSURE: 108 MMHG | RESPIRATION RATE: 18 BRPM | OXYGEN SATURATION: 98 % | DIASTOLIC BLOOD PRESSURE: 70 MMHG | HEART RATE: 111 BPM | TEMPERATURE: 98 F

## 2023-06-04 PROCEDURE — 99239 HOSP IP/OBS DSCHRG MGMT >30: CPT

## 2023-06-04 RX ORDER — THIAMINE MONONITRATE (VIT B1) 100 MG
1 TABLET ORAL
Qty: 30 | Refills: 0
Start: 2023-06-04

## 2023-06-04 RX ORDER — FOLIC ACID 0.8 MG
1 TABLET ORAL
Qty: 30 | Refills: 0
Start: 2023-06-04

## 2023-06-04 RX ORDER — ALBUTEROL 90 UG/1
0 AEROSOL, METERED ORAL
Qty: 0 | Refills: 0 | DISCHARGE

## 2023-06-04 RX ORDER — ALBUTEROL 90 UG/1
2 AEROSOL, METERED ORAL
Qty: 0 | Refills: 0 | DISCHARGE
Start: 2023-06-04

## 2023-06-04 RX ADMIN — Medication 1 MILLIGRAM(S): at 11:51

## 2023-06-04 RX ADMIN — Medication 1 TABLET(S): at 11:51

## 2023-06-04 RX ADMIN — Medication 60 MILLIGRAM(S): at 05:20

## 2023-06-04 RX ADMIN — Medication 25 MILLIGRAM(S): at 05:20

## 2023-06-04 RX ADMIN — Medication 100 MILLIGRAM(S): at 11:51

## 2023-06-04 NOTE — DISCHARGE NOTE NURSING/CASE MANAGEMENT/SOCIAL WORK - PATIENT PORTAL LINK FT
You can access the FollowMyHealth Patient Portal offered by [facility] by registering at the following website: [email or web address]. By joining [facility]’s FollowMyHealth portal, you will also be able to view your health information using other applications (apps) compatible with our system.

## 2023-06-04 NOTE — DISCHARGE NOTE PROVIDER - HOSPITAL COURSE
58 years old female here due to drinking alcohol daily since 12 of 2022. Patient's girl friend called EMS. Pt here states she is depressed but denies harmful thoughts to herself or others. Pt states her last drink was last night. Pt denies recent hx of trauma, headache, sob, chest pain, nausea, vomiting, fever, chills, abd pain, dysuria, hematuria.  ETOH level was 57 on arrival.     ETOH withdrawal.   CIWA protocol with IV Ativan PRN.  CIWA now 1-3  Vital signs stable  Stable lytes     On 6/4/23 this case was reviewed with Dr. Fregoso, the patient is medically stable and optimized for discharge. All medications were reviewed and appropriate prescriptions were sent to mutually agreed upon pharmacy.

## 2023-06-04 NOTE — DISCHARGE NOTE PROVIDER - NSDCMRMEDTOKEN_GEN_ALL_CORE_FT
albuterol 90 mcg/inh inhalation aerosol: 2 puff(s) inhaled every 6 hours As needed Shortness of Breath and/or Wheezing  Claritin 24 Hour Allergy 10 mg oral tablet: 1 tab(s) orally once a day   folic acid 1 mg oral tablet: 1 tab(s) orally once a day  Multiple Vitamins oral tablet: 1 tab(s) orally once a day  thiamine 100 mg oral tablet: 1 tab(s) orally once a day

## 2023-06-04 NOTE — DISCHARGE NOTE PROVIDER - NSDCCPCAREPLAN_GEN_ALL_CORE_FT
PRINCIPAL DISCHARGE DIAGNOSIS  Diagnosis: Alcohol dependence with withdrawal  Assessment and Plan of Treatment: Alcohol Abuse  Alcohol intoxication occurs when the amount of alcohol that a person has consumed impairs his or her ability to mentally and physically function. Chronic alcohol consumption can also lead to a variety of health issues including neurological disease, stomach disease, heart disease, liver disease, etc. Do not drive after drinking alcohol. Drinking enough alcohol to end up in an Emergency Room suggests you may have an alcohol abuse problem. Seek help at a drug addiction center.  SEEK IMMEDIATE MEDICAL CARE IF YOU HAVE ANY OF THE FOLLOWING SYMPTOMS: seizures, vomiting blood, blood in your stool, lightheadedness/dizziness, or becoming shaky to tremulous when you stop drinking.
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
## 2023-06-09 DIAGNOSIS — J45.909 UNSPECIFIED ASTHMA, UNCOMPLICATED: ICD-10-CM

## 2023-06-09 DIAGNOSIS — F32.A DEPRESSION, UNSPECIFIED: ICD-10-CM

## 2023-06-09 DIAGNOSIS — Z79.52 LONG TERM (CURRENT) USE OF SYSTEMIC STEROIDS: ICD-10-CM

## 2023-06-09 DIAGNOSIS — F10.239 ALCOHOL DEPENDENCE WITH WITHDRAWAL, UNSPECIFIED: ICD-10-CM

## 2023-06-09 DIAGNOSIS — R10.9 UNSPECIFIED ABDOMINAL PAIN: ICD-10-CM

## 2023-06-09 DIAGNOSIS — F10.229 ALCOHOL DEPENDENCE WITH INTOXICATION, UNSPECIFIED: ICD-10-CM
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
## 2024-05-31 NOTE — ED ADULT NURSE NOTE - NSIMPLEMENTINTERV_GEN_ALL_ED
12 Hour(s) 41 Minute(s)
Implemented All Universal Safety Interventions:  [place] to call system. Call bell, personal items and telephone within reach. Instruct patient to call for assistance. Room bathroom lighting operational. Non-slip footwear when patient is off stretcher. Physically safe environment: no spills, clutter or unnecessary equipment. Stretcher in lowest position, wheels locked, appropriate side rails in place.